# Patient Record
Sex: FEMALE | Race: BLACK OR AFRICAN AMERICAN | URBAN - METROPOLITAN AREA
[De-identification: names, ages, dates, MRNs, and addresses within clinical notes are randomized per-mention and may not be internally consistent; named-entity substitution may affect disease eponyms.]

---

## 2023-03-22 ENCOUNTER — WEB ENCOUNTER (OUTPATIENT)
Dept: URBAN - METROPOLITAN AREA CLINIC 96 | Facility: CLINIC | Age: 28
End: 2023-03-22

## 2023-03-22 ENCOUNTER — CLAIMS CREATED FROM THE CLAIM WINDOW (OUTPATIENT)
Dept: URBAN - METROPOLITAN AREA CLINIC 96 | Facility: CLINIC | Age: 28
End: 2023-03-22
Payer: COMMERCIAL

## 2023-03-22 VITALS — TEMPERATURE: 97.2 F | WEIGHT: 120 LBS | BODY MASS INDEX: 20.49 KG/M2 | HEIGHT: 64 IN

## 2023-03-22 DIAGNOSIS — K58.1 IRRITABLE BOWEL SYNDROME WITH CONSTIPATION: ICD-10-CM

## 2023-03-22 DIAGNOSIS — K59.00 CONSTIPATION, UNSPECIFIED CONSTIPATION TYPE: ICD-10-CM

## 2023-03-22 PROBLEM — 440630006: Status: ACTIVE | Noted: 2023-03-22

## 2023-03-22 PROBLEM — 14760008: Status: ACTIVE | Noted: 2023-03-22

## 2023-03-22 PROCEDURE — 99204 OFFICE O/P NEW MOD 45 MIN: CPT | Performed by: INTERNAL MEDICINE

## 2023-03-22 RX ORDER — ETONOGESTREL 68 MG/1
AS DIRECTED IMPLANT SUBCUTANEOUS
Status: ACTIVE | COMMUNITY

## 2023-03-22 NOTE — HPI-TODAY'S VISIT:
27 yo fem ref for a gi consultation and a copy will be sent to the ref provider. Pt had had blaoting for about 2 years. Pt saw a naturopath and put on supplements and nothing worked.  Pt was gaining weight. Pt went to another naturopath and put on a lot of supplements. Pt was losing weight but was bloated. Pt was told to stop gluten and dairy. Pt stopped going to her a month ago.  Pt goes to bathroom daily but goes daily but passes type 2 and 3 BMs. Pt has not tried any meds for this. She eats healthy and needs to drik more water. 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

## 2023-04-06 ENCOUNTER — TELEPHONE ENCOUNTER (OUTPATIENT)
Dept: URBAN - METROPOLITAN AREA CLINIC 105 | Facility: CLINIC | Age: 28
End: 2023-04-06

## 2023-04-25 ENCOUNTER — LAB OUTSIDE AN ENCOUNTER (OUTPATIENT)
Dept: URBAN - METROPOLITAN AREA CLINIC 96 | Facility: CLINIC | Age: 28
End: 2023-04-25

## 2023-04-25 ENCOUNTER — DASHBOARD ENCOUNTERS (OUTPATIENT)
Age: 28
End: 2023-04-25

## 2023-04-25 ENCOUNTER — OFFICE VISIT (OUTPATIENT)
Dept: URBAN - METROPOLITAN AREA CLINIC 96 | Facility: CLINIC | Age: 28
End: 2023-04-25
Payer: COMMERCIAL

## 2023-04-25 VITALS
SYSTOLIC BLOOD PRESSURE: 129 MMHG | WEIGHT: 122 LBS | TEMPERATURE: 97.7 F | DIASTOLIC BLOOD PRESSURE: 90 MMHG | BODY MASS INDEX: 20.83 KG/M2 | HEIGHT: 64 IN

## 2023-04-25 DIAGNOSIS — K59.00 CONSTIPATION, UNSPECIFIED CONSTIPATION TYPE: ICD-10-CM

## 2023-04-25 DIAGNOSIS — R10.9 ABDOMINAL CRAMPING: ICD-10-CM

## 2023-04-25 DIAGNOSIS — R14.0 ABDOMINAL BLOATING: ICD-10-CM

## 2023-04-25 DIAGNOSIS — K58.1 IRRITABLE BOWEL SYNDROME WITH CONSTIPATION: ICD-10-CM

## 2023-04-25 LAB
ABSOLUTE BASOPHIL COUNT: 0.01
ABSOLUTE EOSINOPHIL COUNT: 0.04
ABSOLUTE IMMATURE GRANULOCYTE  COUNT: 0.02
ABSOLUTE LYMPHOCYTE COUNT: 1.85
ABSOLUTE MONOCYTE COUNT: 0.33
ABSOLUTE NEUTROPHIL COUNT (ANC): 3.44
ABSOLUTE NRBC  COUNT: 0
AG RATIO: 1
ALBUMIN LEVEL: 3.9
ALK PHOS: 38
ALT: 8
ANION GAP: 7
AST: 14
BASOPHIL AUTO: 0
BILIRUBIN TOTAL: 0.3
BUN/CREAT RATIO: 16
BUN: 13
CALCIUM LEVEL: 9.2
CHLORIDE LEVEL: 107
CO2 LEVEL: 22
CREATININE LEVEL: 0.8
CRP: 0.26
EOS AUTO: 1
GFR 2021: >60
GLUCOSE LEVEL: 79
HCT: 39.5
HGB: 13.1
IMMATURE GRANULOCYTES AUTO: 0.4
IPF: 17.9
LYMPH AUTO: 32
MCH: 27.4
MCHC: 33.2
MCV: 82.6
MONO AUTO: 6
MPV: 13.3
NEUTRO AUTO: 60
NRBC AUTO: 0
OSMO (CALC): 271
PERFORMING LAB: (no result)
PLATELETS: 170
POTASSIUM LEVEL: 4.3
PROTEIN TOTAL: 7.4
RBC: 4.78
RDW: 13.5
SLIDE REVIEW: (no result)
SODIUM LEVEL: 136
T4 FREE: 1
TSH: 1.14
WBC: 5.7

## 2023-04-25 PROCEDURE — 99213 OFFICE O/P EST LOW 20 MIN: CPT

## 2023-04-25 RX ORDER — ETONOGESTREL 68 MG/1
AS DIRECTED IMPLANT SUBCUTANEOUS
Status: ACTIVE | COMMUNITY

## 2023-04-25 NOTE — HPI-TODAY'S VISIT:
Previously in 2/2023 with Dr. Lieberman, 29 yo fem ref for a gi consultation and a copy will be sent to the ref provider. Pt had had blaoting for about 2 years. Pt saw a naturopath and put on supplements and nothing worked.  Pt was gaining weight. Pt went to another naturopath and put on a lot of supplements. Pt was losing weight but was bloated. Pt was told to stop gluten and dairy. Pt stopped going to her a month ago.  Pt goes to bathroom daily but goes daily but passes type 2 and 3 BMs. Pt has not tried any meds for this. She eats healthy and needs to drik more water. 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 . Today on 4/25/2023, patient endorses she feels she has worsened She stopped taking Miralax as she had diarrhea for 2 weeks  Continues to take MoM before bed  Patient states she developed dull pain that did not resolve for days around [address] abdominal pain multiple times throughout the week- describes as lower abdominal cramp  Having a BM daily- denies starining with BMs Denies BRBPR or melena Denies fever or chills

## 2023-04-27 ENCOUNTER — WEB ENCOUNTER (OUTPATIENT)
Dept: URBAN - METROPOLITAN AREA CLINIC 96 | Facility: CLINIC | Age: 28
End: 2023-04-27

## 2023-04-27 LAB
CALPROTECTIN FECES: (no result)
PERFORMING LAB: (no result)

## 2023-04-28 ENCOUNTER — WEB ENCOUNTER (OUTPATIENT)
Dept: URBAN - METROPOLITAN AREA CLINIC 96 | Facility: CLINIC | Age: 28
End: 2023-04-28

## 2023-05-03 ENCOUNTER — WEB ENCOUNTER (OUTPATIENT)
Dept: URBAN - METROPOLITAN AREA CLINIC 96 | Facility: CLINIC | Age: 28
End: 2023-05-03

## 2023-05-12 ENCOUNTER — WEB ENCOUNTER (OUTPATIENT)
Dept: URBAN - METROPOLITAN AREA CLINIC 96 | Facility: CLINIC | Age: 28
End: 2023-05-12

## 2023-05-15 ENCOUNTER — WEB ENCOUNTER (OUTPATIENT)
Dept: URBAN - METROPOLITAN AREA CLINIC 96 | Facility: CLINIC | Age: 28
End: 2023-05-15

## 2023-05-15 RX ORDER — RIFAXIMIN 550 MG/1
1 TABLET TABLET ORAL THREE TIMES A DAY
Qty: 42 TABLET | Refills: 0 | OUTPATIENT
Start: 2023-05-15 | End: 2023-05-29

## 2023-05-15 RX ORDER — ETONOGESTREL 68 MG/1
AS DIRECTED IMPLANT SUBCUTANEOUS
Status: ACTIVE | COMMUNITY

## 2023-05-23 ENCOUNTER — WEB ENCOUNTER (OUTPATIENT)
Dept: URBAN - METROPOLITAN AREA CLINIC 96 | Facility: CLINIC | Age: 28
End: 2023-05-23

## 2023-05-26 ENCOUNTER — OFFICE VISIT (OUTPATIENT)
Dept: URBAN - METROPOLITAN AREA TELEHEALTH 2 | Facility: TELEHEALTH | Age: 28
End: 2023-05-26

## 2023-05-26 ENCOUNTER — WEB ENCOUNTER (OUTPATIENT)
Dept: URBAN - METROPOLITAN AREA CLINIC 96 | Facility: CLINIC | Age: 28
End: 2023-05-26

## 2023-06-02 ENCOUNTER — WEB ENCOUNTER (OUTPATIENT)
Dept: URBAN - METROPOLITAN AREA CLINIC 96 | Facility: CLINIC | Age: 28
End: 2023-06-02

## 2023-06-03 ENCOUNTER — WEB ENCOUNTER (OUTPATIENT)
Dept: URBAN - METROPOLITAN AREA CLINIC 96 | Facility: CLINIC | Age: 28
End: 2023-06-03

## 2023-06-04 ENCOUNTER — LAB OUTSIDE AN ENCOUNTER (OUTPATIENT)
Dept: URBAN - METROPOLITAN AREA CLINIC 96 | Facility: CLINIC | Age: 28
End: 2023-06-04

## 2023-06-16 ENCOUNTER — TELEPHONE ENCOUNTER (OUTPATIENT)
Dept: URBAN - METROPOLITAN AREA CLINIC 96 | Facility: CLINIC | Age: 28
End: 2023-06-16

## 2023-06-21 ENCOUNTER — OFFICE VISIT (OUTPATIENT)
Dept: URBAN - METROPOLITAN AREA CLINIC 98 | Facility: CLINIC | Age: 28
End: 2023-06-21